# Patient Record
Sex: MALE | Race: WHITE | NOT HISPANIC OR LATINO | Employment: FULL TIME | URBAN - METROPOLITAN AREA
[De-identification: names, ages, dates, MRNs, and addresses within clinical notes are randomized per-mention and may not be internally consistent; named-entity substitution may affect disease eponyms.]

---

## 2017-11-03 ENCOUNTER — OFFICE VISIT (OUTPATIENT)
Dept: URGENT CARE | Facility: CLINIC | Age: 37
End: 2017-11-03
Payer: COMMERCIAL

## 2017-11-03 VITALS
RESPIRATION RATE: 18 BRPM | BODY MASS INDEX: 28.93 KG/M2 | HEART RATE: 88 BPM | WEIGHT: 245 LBS | DIASTOLIC BLOOD PRESSURE: 76 MMHG | HEIGHT: 77 IN | SYSTOLIC BLOOD PRESSURE: 122 MMHG | TEMPERATURE: 99 F | OXYGEN SATURATION: 97 %

## 2017-11-03 DIAGNOSIS — L72.9 INFECTED CYST OF SKIN: Primary | ICD-10-CM

## 2017-11-03 DIAGNOSIS — L08.9 INFECTED CYST OF SKIN: Primary | ICD-10-CM

## 2017-11-03 DIAGNOSIS — M67.40 GANGLION CYST: ICD-10-CM

## 2017-11-03 PROCEDURE — 99203 OFFICE O/P NEW LOW 30 MIN: CPT | Mod: S$GLB,,, | Performed by: EMERGENCY MEDICINE

## 2017-11-03 RX ORDER — MUPIROCIN 20 MG/G
OINTMENT TOPICAL
Qty: 22 G | Refills: 1 | Status: SHIPPED | OUTPATIENT
Start: 2017-11-03

## 2017-11-03 RX ORDER — SULFAMETHOXAZOLE AND TRIMETHOPRIM 800; 160 MG/1; MG/1
1 TABLET ORAL 2 TIMES DAILY
Qty: 20 TABLET | Refills: 0 | Status: SHIPPED | OUTPATIENT
Start: 2017-11-03 | End: 2017-11-13

## 2017-11-03 RX ORDER — HYDROCODONE BITARTRATE AND ACETAMINOPHEN 7.5; 325 MG/1; MG/1
1 TABLET ORAL EVERY 8 HOURS PRN
Qty: 9 TABLET | Refills: 0 | Status: SHIPPED | OUTPATIENT
Start: 2017-11-03 | End: 2017-11-06

## 2017-11-03 NOTE — PROGRESS NOTES
"Subjective:       Patient ID: [id number] is a 36 y.o. male.    Vitals:  height is 6' 5" (1.956 m) and weight is 111.1 kg (245 lb). His temperature is 98.8 °F (37.1 °C). His blood pressure is 122/76 and his pulse is 88. His respiration is 18 and oxygen saturation is 97%.     Chief Complaint: Animal Bite    HE IS NOT SURE WHETHER BITE OR UNKNOWN SWELLING CAUSE HOWEVER NOTICED REDNESS, SWELLING, MINIMAL PAIN TO THE LEFT DORSOMEDIAL ASPECT OF THE GREAT TOE ON THE LEFT, AND HE TRIED TO POP IT MANUALLY WITH A NEEDLE AND WITH MANUAL PRESSURE AND WAS UNSUCCESSFUL AND NOTICED REDNESS AND SWELLING HAS GOTTEN WORSE. NO TRAUMA, NOT DIABETIC.      Animal Bite    The incident occurred more than 2 days ago. The incident occurred at home. Head/neck injury location: left foot. There is an injury to the left great toe. The pain is moderate. It is unlikely that a foreign body is present. Pertinent negatives include no chest pain, no abdominal pain, no nausea, no vomiting and no headaches. There have been no prior injuries to these areas.     Review of Systems   Constitution: Negative for chills and fever.   HENT: Negative for sore throat.    Eyes: Negative for blurred vision.   Cardiovascular: Negative for chest pain.   Respiratory: Negative for shortness of breath.    Skin: Positive for color change and poor wound healing. Negative for rash.   Musculoskeletal: Negative for back pain and joint pain.   Gastrointestinal: Negative for abdominal pain, diarrhea, nausea and vomiting.   Neurological: Negative for headaches.   Psychiatric/Behavioral: The patient is not nervous/anxious.        Objective:      Physical Exam   Constitutional: He is oriented to person, place, and time. He appears well-developed and well-nourished.   HENT:   Head: Normocephalic and atraumatic.   Eyes: EOM are normal. Pupils are equal, round, and reactive to light.   Neck: Normal range of motion.   Cardiovascular: Normal rate, regular rhythm and normal " "heart sounds.    Pulmonary/Chest: Effort normal and breath sounds normal.   Abdominal: Soft.   Musculoskeletal: Normal range of motion.   Neurological: He is alert and oriented to person, place, and time.   Skin: Skin is warm. No rash noted. There is erythema. No pallor.   AFFECTED AREA AS BELOW:  LEFT DORSOMEDIAL ASPECT OF THE LEFT GREAT TOE, 2 CM X 3 CM AREA OF SWELLING, REDNESS, MILD TTP AND 2 SCABBED OVER PUNCTURE WOUNDS FROM PREVIOUS ATTEMPTS BY HIM. FLUCTUENCE PRESENT AND SOME INDURATION. ROM OF THE MTP AND IP JOINT IS INTACT, AND DISTALLY NV INTACT   Nursing note and vitals reviewed.      Incision & Drainage  Date/Time: 11/3/2017 2:26 PM  Performed by: UNRULY BATRES  Authorized by: UNRULY BATRES     Time out: Immediately prior to procedure a "time out" was called to verify the correct patient, procedure, equipment, support staff and site/side marked as required.    Consent Done?:  Yes (Verbal)    Type:  Cyst  Body area:  Lower extremity  Location details:  Left big toe  Anesthesia:  Local infiltration  Local anesthetic:  Lidocaine 2% without epinephrinelidocaine 2% without epinephrine  Anesthetic total (ml):  5  Scalpel size: 18 GAGUE NEEDLE.  Incision type:  Single straight  Complexity:  Simple  Drainage:  Pus  Drainage amount:  Moderate  Wound treatment:  Incision and drainage  Patient tolerance:  Patient tolerated the procedure well with no immediate complications    COVERED WITH BACTROBAN OINTMENT  COVERED WITH GAUZE  NO COMPLICATIONS  WILL HAVE PACKING OUT IN 48 HOURS  CLEAR FLUID FOLLOWED BY PUS THE WHITE/TAN THCIK CYSTIC MATERIAL CONSISTENT WITH SAC OF GANGLION CYST. CLINICALLY WITH HIS HX OF AVID VOLLEYBALL FOR 24 YEARS AND JUMPING ETC, CORRELATES WITH ENLARGING GANGLION CYST WHICH BECAME INFECTED AND ABSCESSED WHEN HE TRIED TO POP IT WITH NEEDLE BY HIMSELF AT HOME. COMPLETELY DRINAED HERE IN CLINIC. COVERED WITH BACTROBAN OINTMENT AND GAUZE/PRESSURE BANDAGE. NO " COMPLICATIONS      Assessment:       1. Infected cyst of skin    2. Ganglion cyst        Plan:         Infected cyst of skin    Ganglion cyst    Other orders  -     sulfamethoxazole-trimethoprim 800-160mg (BACTRIM DS) 800-160 mg Tab; Take 1 tablet by mouth 2 (two) times daily.  Dispense: 20 tablet; Refill: 0  -     mupirocin (BACTROBAN) 2 % ointment; Apply to affected area 3 times daily  Dispense: 22 g; Refill: 1  -     hydrocodone-acetaminophen 7.5-325mg (NORCO) 7.5-325 mg per tablet; Take 1 tablet by mouth every 8 (eight) hours as needed.  Dispense: 9 tablet; Refill: 0  -     INCISION AND DRAINAGE          Patient Instructions   KEEP CLEAN AND COVERED  BACTRIM DS RX  BACTROBAN OINTMENT RX  NORCO RX FOR SEVERE PAIN  IBUPROFEN 600 MG EVERY 6 HOURS FOR PAIN/INFLAMMATION  SEE INFECTED CYST DRAINAGE INFO/SHEET  RETURN FOR ANY CONCERNS OR PROBLEMS

## 2017-11-03 NOTE — PATIENT INSTRUCTIONS
KEEP CLEAN AND COVERED  BACTRIM DS RX  BACTROBAN OINTMENT RX  NORCO RX FOR SEVERE PAIN  IBUPROFEN 600 MG EVERY 6 HOURS FOR PAIN/INFLAMMATION  SEE INFECTED CYST DRAINAGE INFO/SHEET  RETURN FOR ANY CONCERNS OR PROBLEMS